# Patient Record
Sex: FEMALE | Race: OTHER | URBAN - METROPOLITAN AREA
[De-identification: names, ages, dates, MRNs, and addresses within clinical notes are randomized per-mention and may not be internally consistent; named-entity substitution may affect disease eponyms.]

---

## 2022-09-01 ENCOUNTER — OFFICE VISIT (OUTPATIENT)
Dept: RHEUMATOLOGY | Facility: CLINIC | Age: 77
End: 2022-09-01
Payer: MEDICAID

## 2022-09-01 VITALS
OXYGEN SATURATION: 96 % | HEIGHT: 62 IN | BODY MASS INDEX: 29.44 KG/M2 | WEIGHT: 160 LBS | RESPIRATION RATE: 16 BRPM | TEMPERATURE: 98 F | DIASTOLIC BLOOD PRESSURE: 75 MMHG | HEART RATE: 69 BPM | SYSTOLIC BLOOD PRESSURE: 125 MMHG

## 2022-09-01 DIAGNOSIS — M17.0 PRIMARY OSTEOARTHRITIS OF BOTH KNEES: ICD-10-CM

## 2022-09-01 DIAGNOSIS — M19.90 INFLAMMATORY ARTHRITIS: Primary | ICD-10-CM

## 2022-09-01 DIAGNOSIS — M67.911 BILATERAL ROTATOR CUFF DYSFUNCTION: ICD-10-CM

## 2022-09-01 DIAGNOSIS — M54.50 CHRONIC LOW BACK PAIN, UNSPECIFIED BACK PAIN LATERALITY, UNSPECIFIED WHETHER SCIATICA PRESENT: ICD-10-CM

## 2022-09-01 DIAGNOSIS — R79.0 LOW FERRITIN: ICD-10-CM

## 2022-09-01 DIAGNOSIS — M35.9 AUTOIMMUNE DISEASE (HCC): ICD-10-CM

## 2022-09-01 DIAGNOSIS — M67.912 BILATERAL ROTATOR CUFF DYSFUNCTION: ICD-10-CM

## 2022-09-01 DIAGNOSIS — M10.9 GOUT, UNSPECIFIED CAUSE, UNSPECIFIED CHRONICITY, UNSPECIFIED SITE: ICD-10-CM

## 2022-09-01 DIAGNOSIS — G89.29 CHRONIC LOW BACK PAIN, UNSPECIFIED BACK PAIN LATERALITY, UNSPECIFIED WHETHER SCIATICA PRESENT: ICD-10-CM

## 2022-09-01 DIAGNOSIS — Z51.81 THERAPEUTIC DRUG MONITORING: ICD-10-CM

## 2022-09-01 PROCEDURE — 99244 OFF/OP CNSLTJ NEW/EST MOD 40: CPT | Performed by: INTERNAL MEDICINE

## 2022-09-01 PROCEDURE — 3074F SYST BP LT 130 MM HG: CPT | Performed by: INTERNAL MEDICINE

## 2022-09-01 PROCEDURE — 3008F BODY MASS INDEX DOCD: CPT | Performed by: INTERNAL MEDICINE

## 2022-09-01 PROCEDURE — 3078F DIAST BP <80 MM HG: CPT | Performed by: INTERNAL MEDICINE

## 2022-09-01 RX ORDER — ASPIRIN 81 MG/1
81 TABLET ORAL DAILY
COMMUNITY

## 2022-09-01 RX ORDER — ALLOPURINOL 100 MG/1
100 TABLET ORAL DAILY
COMMUNITY
Start: 2022-06-06

## 2022-09-01 RX ORDER — OMEPRAZOLE 40 MG/1
40 CAPSULE, DELAYED RELEASE ORAL NIGHTLY
COMMUNITY
Start: 2022-08-25

## 2022-09-01 RX ORDER — FERROUS SULFATE 325(65) MG
325 TABLET ORAL
Qty: 90 TABLET | Refills: 1 | Status: SHIPPED | OUTPATIENT
Start: 2022-09-01

## 2022-09-01 RX ORDER — LISINOPRIL 5 MG/1
5 TABLET ORAL DAILY
COMMUNITY
Start: 2022-06-06

## 2022-09-01 RX ORDER — ACETAMINOPHEN 500 MG
500 TABLET ORAL EVERY 6 HOURS PRN
COMMUNITY

## 2022-09-01 RX ORDER — METOPROLOL SUCCINATE 25 MG/1
25 TABLET, EXTENDED RELEASE ORAL DAILY
COMMUNITY
Start: 2022-06-06

## 2022-09-12 NOTE — PROGRESS NOTES
Please call pt's daughter. Quite elevated liver tests. Any new medications/herbs/supplements or infections such as viruses? Any abdominal pain? Stop taking tylenol and allopurinol for now.

## 2022-09-12 NOTE — TELEPHONE ENCOUNTER
Talked to granddaughter, who is translating. Patient is having some abd pain currently. She had diarrhea starting on the day of labs, some abd pain, but this has been improving. Patient went to GI. Has appointment on October 7th. Recommend repeating hepatic panel in 2 weeks. I discussed that the likely cause of the elevated AST/ALT is from her acute diarrheal illness. Likely gastroenteritis. Discussed that once hepatic panel is back we can discuss further treatments for her knee arthralgia. There is chondrocalcinosis noted on x-rays. This could represent pseudogout in the correct clinical context though this may be an incidental finding.

## 2022-09-12 NOTE — TELEPHONE ENCOUNTER
Patients daughter called and informed us that the patients current medication, Omeprazone, increased dosage to 40MG and just started Ferrous Sulfate. Also stated that the patient is having abdominal pain.

## 2022-09-27 NOTE — PROGRESS NOTES
Please call daughter. Any abdominal symptoms: Nausea, vomiting, diarrhea? AST/ALT/ALP improving, but still elevated. I suspect the elevated liver tests are related to your recent infection/stomach bug. Who is the gastroenterologist patient is seeing on October 7? There are some therapies for the joints, however we need to figure out what is going on with the liver first before we can proceed safely.

## 2022-09-27 NOTE — TELEPHONE ENCOUNTER
Please fax 9/24 and 9/10 labs to   [facility]   Fax:  [phone] or [phone]. I send copies through [employer], but there was a different fax number listed.

## 2022-09-27 NOTE — TELEPHONE ENCOUNTER
Patient called to give info on Gastro Dr. To Dr. Zaynab Quezada, Efren Fatima, [phone] Fax [phone], kojo stated that Dr. Jennifer Monk wanted to reach out to [facility] prior to patients procedure.

## 2022-11-07 NOTE — TELEPHONE ENCOUNTER
Patients granddaughter called and inquired about her test results. Would like to know if she should schedule an appointment or are you able to call to discuss. Please advise.

## 2022-11-10 NOTE — PROGRESS NOTES
Procedure Note: right knee injection. The risks, benefits, and alternatives of the procedure were explained, and verbal consent was obtained. Area over medial midpatellar aspect of right knee was prepped with chlorhexidine x 2 and then 70% isopropyl alcohol stick swab x 3  and numbed with ethyl choloride spray. 25-gauge needle was inserted, and 40 mg of kenalog and 2 cc of lidocaine was injected. Patient tolerated procedure well without any immediate complications. Procedure Note: left knee injection. The risks, benefits, and alternatives of the procedure were explained, and verbal consent was obtained. Area over medial midpatellar aspect of left knee was prepped with chlorhexidine x 2 and then 70% isopropyl alcohol stick swab x 3  and numbed with ethyl choloride spray. 25-gauge needle was inserted, and 40 mg of kenalog and 2 cc of lidocaine was injected. Patient tolerated procedure well without any immediate complications.

## 2022-11-19 NOTE — TELEPHONE ENCOUNTER
Please call pt's daughter. Evaluate liver/biliary tree obstruction. Persistently elevated AST/ALT/ALP. RUQ US ordered.  Patient may need to call insurance to see if prior [address] is needed

## 2022-11-21 NOTE — TELEPHONE ENCOUNTER
Pt daughter returned my call, notified of Dr. Aimee Sanchez message. Evaluate liver/biliary tree obstruction. Persistently elevated AST/ALT/ALP. RUQ US ordered. Voiced understanding, number to CS given.